# Patient Record
Sex: FEMALE | Race: WHITE | Employment: UNEMPLOYED | URBAN - METROPOLITAN AREA
[De-identification: names, ages, dates, MRNs, and addresses within clinical notes are randomized per-mention and may not be internally consistent; named-entity substitution may affect disease eponyms.]

---

## 2021-04-10 ENCOUNTER — APPOINTMENT (OUTPATIENT)
Dept: GENERAL RADIOLOGY | Age: 51
End: 2021-04-10
Attending: STUDENT IN AN ORGANIZED HEALTH CARE EDUCATION/TRAINING PROGRAM

## 2021-04-10 ENCOUNTER — HOSPITAL ENCOUNTER (EMERGENCY)
Dept: CT IMAGING | Age: 51
Discharge: HOME | End: 2021-04-10
Attending: STUDENT IN AN ORGANIZED HEALTH CARE EDUCATION/TRAINING PROGRAM

## 2021-04-10 ENCOUNTER — HOSPITAL ENCOUNTER (EMERGENCY)
Age: 51
Discharge: HOME | End: 2021-04-10
Attending: STUDENT IN AN ORGANIZED HEALTH CARE EDUCATION/TRAINING PROGRAM

## 2021-04-10 VITALS
RESPIRATION RATE: 14 BRPM | OXYGEN SATURATION: 99 % | TEMPERATURE: 96.7 F | BODY MASS INDEX: 23.56 KG/M2 | HEIGHT: 60 IN | SYSTOLIC BLOOD PRESSURE: 118 MMHG | HEART RATE: 84 BPM | WEIGHT: 120 LBS | DIASTOLIC BLOOD PRESSURE: 80 MMHG

## 2021-04-10 DIAGNOSIS — G40.909 SEIZURE DISORDER (HCC): Primary | ICD-10-CM

## 2021-04-10 LAB
ALBUMIN SERPL-MCNC: 3.8 G/DL (ref 3.4–5)
ALBUMIN/GLOB SERPL: 1.2 {RATIO} (ref 0.8–1.7)
ALP SERPL-CCNC: 75 U/L (ref 45–117)
ALT SERPL-CCNC: 23 U/L (ref 13–56)
ANION GAP SERPL CALC-SCNC: 6 MMOL/L (ref 3–18)
APPEARANCE UR: CLEAR
AST SERPL-CCNC: 16 U/L (ref 10–38)
ATRIAL RATE: 103 BPM
BACTERIA URNS QL MICRO: ABNORMAL /HPF
BASOPHILS # BLD: 0.1 K/UL (ref 0–0.1)
BASOPHILS NFR BLD: 1 % (ref 0–2)
BILIRUB DIRECT SERPL-MCNC: <0.1 MG/DL (ref 0–0.2)
BILIRUB SERPL-MCNC: 0.2 MG/DL (ref 0.2–1)
BILIRUB UR QL: NEGATIVE
BUN SERPL-MCNC: 22 MG/DL (ref 7–18)
BUN/CREAT SERPL: 39 (ref 12–20)
CALCIUM SERPL-MCNC: 8.7 MG/DL (ref 8.5–10.1)
CALCULATED P AXIS, ECG09: 65 DEGREES
CALCULATED R AXIS, ECG10: 20 DEGREES
CALCULATED T AXIS, ECG11: 24 DEGREES
CHLORIDE SERPL-SCNC: 108 MMOL/L (ref 100–111)
CO2 SERPL-SCNC: 28 MMOL/L (ref 21–32)
COLOR UR: YELLOW
CREAT SERPL-MCNC: 0.56 MG/DL (ref 0.6–1.3)
DIAGNOSIS, 93000: NORMAL
DIFFERENTIAL METHOD BLD: ABNORMAL
EOSINOPHIL # BLD: 0.1 K/UL (ref 0–0.4)
EOSINOPHIL NFR BLD: 2 % (ref 0–5)
EPITH CASTS URNS QL MICRO: ABNORMAL /LPF (ref 0–5)
ERYTHROCYTE [DISTWIDTH] IN BLOOD BY AUTOMATED COUNT: 11.9 % (ref 11.6–14.5)
ETHANOL SERPL-MCNC: 72 MG/DL (ref 0–3)
GLOBULIN SER CALC-MCNC: 3.3 G/DL (ref 2–4)
GLUCOSE SERPL-MCNC: 95 MG/DL (ref 74–99)
GLUCOSE UR STRIP.AUTO-MCNC: NEGATIVE MG/DL
HCG SERPL QL: NEGATIVE
HCT VFR BLD AUTO: 35.4 % (ref 35–45)
HGB BLD-MCNC: 12 G/DL (ref 12–16)
HGB UR QL STRIP: NEGATIVE
KETONES UR QL STRIP.AUTO: NEGATIVE MG/DL
LEUKOCYTE ESTERASE UR QL STRIP.AUTO: ABNORMAL
LIPASE SERPL-CCNC: 185 U/L (ref 73–393)
LYMPHOCYTES # BLD: 2.2 K/UL (ref 0.9–3.6)
LYMPHOCYTES NFR BLD: 35 % (ref 21–52)
MAGNESIUM SERPL-MCNC: 2.4 MG/DL (ref 1.6–2.6)
MCH RBC QN AUTO: 29.9 PG (ref 24–34)
MCHC RBC AUTO-ENTMCNC: 33.9 G/DL (ref 31–37)
MCV RBC AUTO: 88.3 FL (ref 74–97)
MONOCYTES # BLD: 0.3 K/UL (ref 0.05–1.2)
MONOCYTES NFR BLD: 5 % (ref 3–10)
NEUTS SEG # BLD: 3.6 K/UL (ref 1.8–8)
NEUTS SEG NFR BLD: 57 % (ref 40–73)
NITRITE UR QL STRIP.AUTO: NEGATIVE
P-R INTERVAL, ECG05: 126 MS
PH UR STRIP: 5 [PH] (ref 5–8)
PLATELET # BLD AUTO: 308 K/UL (ref 135–420)
PMV BLD AUTO: 8.9 FL (ref 9.2–11.8)
POTASSIUM SERPL-SCNC: 3.6 MMOL/L (ref 3.5–5.5)
PROT SERPL-MCNC: 7.1 G/DL (ref 6.4–8.2)
PROT UR STRIP-MCNC: NEGATIVE MG/DL
Q-T INTERVAL, ECG07: 348 MS
QRS DURATION, ECG06: 80 MS
QTC CALCULATION (BEZET), ECG08: 455 MS
RBC # BLD AUTO: 4.01 M/UL (ref 4.2–5.3)
RBC #/AREA URNS HPF: ABNORMAL /HPF (ref 0–5)
SODIUM SERPL-SCNC: 142 MMOL/L (ref 136–145)
SP GR UR REFRACTOMETRY: 1.01 (ref 1–1.03)
TROPONIN I SERPL-MCNC: <0.02 NG/ML (ref 0–0.04)
UROBILINOGEN UR QL STRIP.AUTO: 0.2 EU/DL (ref 0.2–1)
VENTRICULAR RATE, ECG03: 103 BPM
WBC # BLD AUTO: 6.3 K/UL (ref 4.6–13.2)
WBC URNS QL MICRO: ABNORMAL /HPF (ref 0–4)

## 2021-04-10 PROCEDURE — 84484 ASSAY OF TROPONIN QUANT: CPT

## 2021-04-10 PROCEDURE — 70450 CT HEAD/BRAIN W/O DYE: CPT

## 2021-04-10 PROCEDURE — 80048 BASIC METABOLIC PNL TOTAL CA: CPT

## 2021-04-10 PROCEDURE — 85025 COMPLETE CBC W/AUTO DIFF WBC: CPT

## 2021-04-10 PROCEDURE — 96365 THER/PROPH/DIAG IV INF INIT: CPT

## 2021-04-10 PROCEDURE — 84703 CHORIONIC GONADOTROPIN ASSAY: CPT

## 2021-04-10 PROCEDURE — 74011250636 HC RX REV CODE- 250/636: Performed by: STUDENT IN AN ORGANIZED HEALTH CARE EDUCATION/TRAINING PROGRAM

## 2021-04-10 PROCEDURE — 72125 CT NECK SPINE W/O DYE: CPT

## 2021-04-10 PROCEDURE — 96366 THER/PROPH/DIAG IV INF ADDON: CPT

## 2021-04-10 PROCEDURE — 82077 ASSAY SPEC XCP UR&BREATH IA: CPT

## 2021-04-10 PROCEDURE — 81001 URINALYSIS AUTO W/SCOPE: CPT

## 2021-04-10 PROCEDURE — 80076 HEPATIC FUNCTION PANEL: CPT

## 2021-04-10 PROCEDURE — 93005 ELECTROCARDIOGRAM TRACING: CPT

## 2021-04-10 PROCEDURE — 96375 TX/PRO/DX INJ NEW DRUG ADDON: CPT

## 2021-04-10 PROCEDURE — 71045 X-RAY EXAM CHEST 1 VIEW: CPT

## 2021-04-10 PROCEDURE — 83690 ASSAY OF LIPASE: CPT

## 2021-04-10 PROCEDURE — 74011250637 HC RX REV CODE- 250/637: Performed by: STUDENT IN AN ORGANIZED HEALTH CARE EDUCATION/TRAINING PROGRAM

## 2021-04-10 PROCEDURE — 99285 EMERGENCY DEPT VISIT HI MDM: CPT

## 2021-04-10 PROCEDURE — 83735 ASSAY OF MAGNESIUM: CPT

## 2021-04-10 RX ORDER — LAMOTRIGINE 25 MG/1
25 TABLET ORAL
Status: COMPLETED | OUTPATIENT
Start: 2021-04-10 | End: 2021-04-10

## 2021-04-10 RX ORDER — LEVETIRACETAM 10 MG/ML
1000 INJECTION INTRAVASCULAR ONCE
Status: COMPLETED | OUTPATIENT
Start: 2021-04-10 | End: 2021-04-10

## 2021-04-10 RX ORDER — KETOROLAC TROMETHAMINE 15 MG/ML
15 INJECTION, SOLUTION INTRAMUSCULAR; INTRAVENOUS
Status: COMPLETED | OUTPATIENT
Start: 2021-04-10 | End: 2021-04-10

## 2021-04-10 RX ORDER — LAMOTRIGINE 25 MG/1
25 TABLET ORAL DAILY
Qty: 14 TAB | Refills: 0 | Status: SHIPPED | OUTPATIENT
Start: 2021-04-10 | End: 2021-04-24

## 2021-04-10 RX ORDER — ACETAMINOPHEN 500 MG
1000 TABLET ORAL
Status: COMPLETED | OUTPATIENT
Start: 2021-04-10 | End: 2021-04-10

## 2021-04-10 RX ADMIN — SODIUM CHLORIDE 1000 ML: 9 INJECTION, SOLUTION INTRAVENOUS at 11:03

## 2021-04-10 RX ADMIN — KETOROLAC TROMETHAMINE 15 MG: 15 INJECTION, SOLUTION INTRAMUSCULAR; INTRAVENOUS at 12:09

## 2021-04-10 RX ADMIN — LAMOTRIGINE 25 MG: 25 TABLET ORAL at 13:12

## 2021-04-10 RX ADMIN — ACETAMINOPHEN 1000 MG: 500 TABLET, FILM COATED ORAL at 11:03

## 2021-04-10 RX ADMIN — LEVETIRACETAM 1000 MG: 10 INJECTION, SOLUTION INTRAVENOUS at 11:04

## 2021-04-10 NOTE — ED PROVIDER NOTES
53-year-old female with past medical history significant for seizure disorder for which she takes Keppra but she does not remember the dosage of the medication and bilateral tubal ligation presents to the ED after a seizure. This was witnessed by a bystander [place]. She does not remember falling. She is complaining of a headache, neck pain and chest pain that she describes as a dull sensation. Nothing seems to make it better or worse. She did not take any pain medication prior to arrival here. She claims she takes her Keppra every day as prescribed. She denies any fever, chills, shortness of breath or any other complaints. She does not smoke cigarettes but drinks alcohol and uses CBD oil; her last drink was 3 days ago. Family history reviewed and noncontributory. No past medical history on file. No past surgical history on file. No family history on file.     Social History     Socioeconomic History    Marital status: Not on file     Spouse name: Not on file    Number of children: Not on file    Years of education: Not on file    Highest education level: Not on file   Occupational History    Not on file   Social Needs    Financial resource strain: Not on file    Food insecurity     Worry: Not on file     Inability: Not on file    Transportation needs     Medical: Not on file     Non-medical: Not on file   Tobacco Use    Smoking status: Not on file   Substance and Sexual Activity    Alcohol use: Not on file    Drug use: Not on file    Sexual activity: Not on file   Lifestyle    Physical activity     Days per week: Not on file     Minutes per session: Not on file    Stress: Not on file   Relationships    Social connections     Talks on phone: Not on file     Gets together: Not on file     Attends [employer] service: Not on file     Active member of club or organization: Not on file     Attends meetings of clubs or organizations: Not on file Relationship status: Not on file    Intimate partner violence     Fear of current or ex partner: Not on file     Emotionally abused: Not on file     Physically abused: Not on file     Forced sexual activity: Not on file   Other Topics Concern    Not on file   Social History Narrative    Not on file         ALLERGIES: Sulfa (sulfonamide antibiotics)    Review of Systems   Constitutional: Negative for activity change and appetite change. HENT: Negative for drooling and facial swelling. Eyes: Negative for pain and discharge. Respiratory: Negative for apnea and choking. Cardiovascular: Positive for chest pain. Negative for palpitations and leg swelling. Gastrointestinal: Negative for blood in stool and rectal pain. Endocrine: Negative for polydipsia and polyphagia. Genitourinary: Negative for genital sores and hematuria. Musculoskeletal: Positive for neck pain. Negative for gait problem and neck stiffness. Skin: Negative for color change and rash. Allergic/Immunologic: Negative for environmental allergies. Neurological: Positive for seizures and headaches. Negative for tremors. Hematological: Negative for adenopathy. Psychiatric/Behavioral: Negative for agitation and behavioral problems. There were no vitals filed for this visit. Physical Exam  Vitals signs and nursing note reviewed. Constitutional:       Appearance: Normal appearance. HENT:      Head: Normocephalic and atraumatic. Eyes:      Extraocular Movements: Extraocular movements intact. Pupils: Pupils are equal, round, and reactive to light. Neck:      Comments: C-collar in place  Cardiovascular:      Rate and Rhythm: Normal rate and regular rhythm. Heart sounds: Normal heart sounds. No murmur. No friction rub. Pulmonary:      Effort: Pulmonary effort is normal.      Breath sounds: Normal breath sounds. Abdominal:      Palpations: Abdomen is soft. Musculoskeletal: Normal range of motion. Skin:     General: Skin is warm and dry. Capillary Refill: Capillary refill takes less than 2 seconds. Neurological:      General: No focal deficit present. Mental Status: She is alert and oriented to person, place, and time. Psychiatric:         Mood and Affect: Mood normal.          MDM  Number of Diagnoses or Management Options  Seizure disorder [facility])  Diagnosis management comments: 44-year-old female with history of seizures on Keppra for which she claims she takes every day but does not remember the dosage of the medication presents to the ED after a seizure witnessed by bystanders. Given head trauma and complains of headache and neck pain, will obtain CT of the head and cervical spine to evaluate for any acute pathology. She is also complaining of chest pain; will perform ACS work-up. IV Keppra. IV fluid bolus. Pain control with Tylenol. Will reassess. EKG is sinus tachycardia at a rate of 103 with normal intervals, no ST elevations or depressions, no T wave inversions as interpreted by me. Chest x-ray negative for any acute pathology as interpreted by me. Troponin negative. Pregnancy test negative. Patient is dehydrated with an elevated BUN to creatinine ratio of 39. Remainder of laboratory work-up unremarkable. CT head and cervical spine negative for any acute pathology. C-collar removed. UA negative for UTI. Upon further questioning, it was discovered that the patient does not take Keppra at home but instead takes lamotrigine 25mg daily and has the pill bottle with her. Home dose of lamotrigine ordered. Patient has remained asymptomatic and has not exhibited any further seizure activity during her stay in the ED. She is visiting the area from out of town and states that she plans on seeing her primary doctor in a few days. She was counseled on the importance of strict anti-epileptic medication compliance. She is stable for discharge home. Pt has been reexamined.  Patient has no new complaints, changes, or physical findings. Care plan outlined and precautions discussed. Results were reviewed with the patient. All medications were reviewed with the patient; will d/c home with PMD f/u. All of pt's questions and concerns were addressed. Patient was instructed and agrees to follow up with PMD, as well as to return to the ED upon further deterioration. Patient is ready to go home. This note was dictated utilizing voice recognition software which may lead to typographical errors.  I apologize in advance if the situation occurs.  If questions arise please do not hesitate to contact me or call our department.     Markell Kaplan, DO           Procedures

## 2021-04-10 NOTE — ED TRIAGE NOTES
Pt arrives to ED via [facility] s/t seizure and fall. [place] and activity was not witnessed. Pt arrives in [facility].

## 2021-08-29 ENCOUNTER — APPOINTMENT (OUTPATIENT)
Dept: GENERAL RADIOLOGY | Age: 51
End: 2021-08-29
Attending: PHYSICIAN ASSISTANT
Payer: MEDICAID

## 2021-08-29 ENCOUNTER — HOSPITAL ENCOUNTER (EMERGENCY)
Age: 51
Discharge: HOME | End: 2021-08-29
Attending: EMERGENCY MEDICINE
Payer: MEDICAID

## 2021-08-29 ENCOUNTER — APPOINTMENT (OUTPATIENT)
Dept: CT IMAGING | Age: 51
End: 2021-08-29
Attending: PHYSICIAN ASSISTANT
Payer: MEDICAID

## 2021-08-29 VITALS
SYSTOLIC BLOOD PRESSURE: 122 MMHG | OXYGEN SATURATION: 99 % | HEART RATE: 87 BPM | BODY MASS INDEX: 22.58 KG/M2 | DIASTOLIC BLOOD PRESSURE: 85 MMHG | HEIGHT: 60 IN | RESPIRATION RATE: 18 BRPM | TEMPERATURE: 98.4 F | WEIGHT: 115 LBS

## 2021-08-29 DIAGNOSIS — T07.XXXA MULTIPLE CONTUSIONS: Primary | ICD-10-CM
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
## 2021-08-29 PROCEDURE — 74011250636 HC RX REV CODE- 250/636: Performed by: PHYSICIAN ASSISTANT

## 2021-08-29 PROCEDURE — 90715 TDAP VACCINE 7 YRS/> IM: CPT | Performed by: PHYSICIAN ASSISTANT

## 2021-08-29 PROCEDURE — 73502 X-RAY EXAM HIP UNI 2-3 VIEWS: CPT

## 2021-08-29 PROCEDURE — 99283 EMERGENCY DEPT VISIT LOW MDM: CPT

## 2021-08-29 PROCEDURE — 72125 CT NECK SPINE W/O DYE: CPT

## 2021-08-29 PROCEDURE — 90471 IMMUNIZATION ADMIN: CPT

## 2021-08-29 PROCEDURE — 73080 X-RAY EXAM OF ELBOW: CPT

## 2021-08-29 PROCEDURE — 70450 CT HEAD/BRAIN W/O DYE: CPT

## 2021-08-29 RX ORDER — AMOXICILLIN AND CLAVULANATE POTASSIUM 875; 125 MG/1; MG/1
1 TABLET, FILM COATED ORAL 2 TIMES DAILY
Qty: 20 TABLET | Refills: 0 | Status: SHIPPED | OUTPATIENT
Start: 2021-08-29 | End: 2021-09-08

## 2021-08-29 RX ADMIN — TETANUS TOXOID, REDUCED DIPHTHERIA TOXOID AND ACELLULAR PERTUSSIS VACCINE, ADSORBED 0.5 ML: 5; 2.5; 8; 8; 2.5 SUSPENSION INTRAMUSCULAR at 20:45

## 2021-08-29 NOTE — ED TRIAGE NOTES
Patient arrived via EMS for fall. EMS patient had a couple margaritas with lunch, was walking dog and fell. No known LOC, states hit her head. Having head, neck, left elbow, left hip pain.

## 2021-08-30 NOTE — ED PROVIDER NOTES
[address] [facility],[address]  [facility] EMERGENCY DEPT    Date: 8/29/2021  Patient Name: Sharon Espana    History of Presenting Illness     Chief Complaint   Patient presents with    Fall     54 y.o. female otherwise healthy presents the ED via EMS for complaints of a fall prior to arrival.  Patient states she had a chavez with lunch, was walking her dog, when she tripped and fell, hitting the back of her head. She does not recall any LOC. States that she has a slight knot to the back of her head. Also complaining of posterior neck, left elbow, left hip pain. She denies any numbness, weakness, other injury, other symptoms. Patient denies any other associated signs or symptoms. Patient denies any other complaints. Nursing notes regarding the HPI and triage nursing notes were reviewed. Prior medical records were reviewed. Past History     Past Medical History:  None     Past Surgical History:  No past surgical history on file. Family History:  No family history on file. Social History:  Social History     Tobacco Use    Smoking status: Not on file   Substance Use Topics    Alcohol use: Not on file    Drug use: Not on file       Allergies: Allergies   Allergen Reactions    Sulfa (Sulfonamide Antibiotics) Anaphylaxis       Patient's primary care provider (as noted in EPIC): Connor, MD Jacob    Review of Systems   Constitutional:  Denies malaise, fever, chills. ENMT:  Denies sore throat. Neck: + neck pain. Chest:  Denies injury. Cardiac:  Denies chest pain  Respiratory:  Denies shortness of breath. GI/ABD:  Denies injury, pain, distention, nausea, vomiting, diarrhea. :  Denies injury, pain, dysuria or urgency. Back:  Denies injury or pain. Pelvis:  Denies injury or pain. Extremity/MS: + left elbow pain, left hip pain. Neuro: + headache. Denies LOC, dizziness, neurologic symptoms/deficits/paresthesias. Skin: Denies injury, rash, itching or skin changes.   All other systems negative as reviewed. Visit Vitals  /85   Pulse 87   Temp 98.4 °F (36.9 °C)   Resp 18   Ht 5' (1.524 m)   Wt 52.2 kg (115 lb)   SpO2 99%   BMI 22.46 kg/m²       PHYSICAL EXAM:    CONSTITUTIONAL: Alert, appears mildly uncomfortable; well-developed; well-nourished. HEAD:  Normocephalic, atraumatic. No Battles sign. No Raccoons eyes. EYES:  EOM's intact. Normal conjunctiva. Anicteric sclera. ENTM: Nose: no rhinorrhea; Oropharynx:  mucous membranes moist  Neck: In c-collar, bony tenderness palpation to C-spine, no step-off. RESP: Chest clear, equal breath sounds. Without wheezes, rhonchi, rales. CARDIOVASCULAR:  Regular rate and rhythm. No murmurs, rubs, or gallops. GI: Normal bowel sounds, abdomen soft and non-tender. No masses or organomegaly. : No costo-vertebral angle tenderness. BACK:  No TLS vertebral bony point tenderness or step-off. UPPER EXT:  Normal inspection, mild tenderness palpation to olecranon process, neurovascular intact distally with 5 5 strength throughout. LOWER EXT: Normal gait. Full range of motion of left hip, no tenderness palpation, neurovascular intact distally with 5/5 strength. Normal inspection. NEURO: Grossly normal motor and sensation. SKIN: No rashes; Normal for age and stage. Few superficial abrasions to bilateral hands, without surrounding erythema/edema/drainage. PSYCH:  Alert and oriented, normal affect. DIFFERENTIAL DIAGNOSES/ MEDICAL DECISION MAKING:  Contusion, hematoma, muscle strain/ sprain, ligamentous strain/ sprain, ligamentous tear/ disruption or a combination of the above. X-ray left hip: No acute process. X-ray left elbow: No acute process. CT HEAD WO CONT    Result Date: 8/29/2021  EXAM: CT of the Head without contrast INDICATION:  fall pain after a fall TECHNIQUE: CT of the head from the vertex to the skull base performed. No IV contrast administered.  All CT scans at this facility are performed using dose optimization technique as appropriate to a performed exam, to include automated exposure control, adjustment of the mA and/or kV according to patient size (including appropriate matching for site specific examination) or use of iterative reconstruction technique. COMPARISON: 4/10/2012 FINDINGS: No evidence of acute intra-axial or extra-axial hemorrhage. The ventricles and sulci are symmetric. No midline shift, mass effect or mass lesion appreciated. The gray-white junction is preserved. No evidence of an acute infarct identified. The mastoid air cells are well aerated. The paranasal sinuses are unremarkable. The orbits are normal. The scalp and skull are unremarkable. 1.   No acute intracranial process identified. CT SPINE CERV WO CONT    Result Date: 8/29/2021  EXAM: CT of the Cervical Spine without contrast. INDICATION:  fall . Pain after fall TECHNIQUE: CT of the cervical spine without contrast. Sagittal and coronal reformations obtained. All CT scans at this facility are performed using dose optimization technique as appropriate to a performed exam, to include automated exposure control, adjustment of the mA and/or kV according to patient size (including appropriate matching for site specific examination) or use of iterative reconstruction technique. COMPARISON:4/10/2021 FINDINGS: The craniocervical junction is intact. No prevertebral edema appreciated. There is straightening of the cervical or doses. Mild anterolisthesis of C3 on C4 and C4 on C5 is noted. This was present previously. Facets are appropriately aligned. No evidence of acute cervical spine fracture. Multilevel degenerative disc disease with uncovertebral spurring and facet arthropathy. No evidence of severe canal or neural foraminal stenosis. The soft tissues of the neck are unremarkable. The thyroid is unremarkable. The visualized lung apices are unremarkable. 1.  No acute cervical spine fracture 2.   Multilevel degenerative disc disease and facet arthropathy without evidence of severe canal or neural foraminal stenosis. ED COURSE:      IMPRESSION AND MEDICAL DECISION MAKING:  Based upon the patients presentation with noted HPI and PE, along with the work up done in the emergency department, I believe that the patient is having noted contusion(s). Patient also has few superficial abrasions to her bilateral hands, states that her puppy bites her sometimes. Patient's tetanus was updated. Rx provided for Augmentin. No sign of cellulitis to these sites. She appears clinically sober, able to have a full conversation without any difficulty. She also is able to ambulate without any difficulty. Pt will obtain ride home as she was brought in by medics. Plan for tylenol/ibuprofen at home for her contusions, f/u with PCP. Strict instructions to return for any acute worsening. Diagnosis:   1. Multiple contusions    2.  Fall, initial encounter      Disposition: Discharge    Follow-up Information     Follow up With Specialties Details Why [address] at [employer]  In 3 days  [address], [address] [facility] [address] [phone]    [facility] EMERGENCY DEPT Emergency Medicine  If symptoms worsen [address] [place] [address] [address] [id number] [address] [place], [place]